# Patient Record
Sex: FEMALE | Race: AMERICAN INDIAN OR ALASKA NATIVE | NOT HISPANIC OR LATINO | Employment: OTHER | URBAN - METROPOLITAN AREA
[De-identification: names, ages, dates, MRNs, and addresses within clinical notes are randomized per-mention and may not be internally consistent; named-entity substitution may affect disease eponyms.]

---

## 2017-01-06 ENCOUNTER — CARE COORDINATION (OUTPATIENT)
Dept: GASTROENTEROLOGY | Facility: CLINIC | Age: 67
End: 2017-01-06

## 2017-01-06 NOTE — PROGRESS NOTES
Attempted to reach patient for check in, no answer, message left on patient's and friend Alyssa's phone requesting call back, number provided.

## 2017-01-13 NOTE — PROGRESS NOTES
Patient states she is doing well.  She received speech therapy to help with word finding, and states the issues she is having are due to previous TBI, not a new condition.  She continues to live at [facility] in [place].  She is provided one meal per day at lunch, and neighbor Alyssa continues to assist with medication set-up.  Reviewed upcoming appointment with Dr. Coyne in February, and patient requests a check in call in a couple weeks.  Patient has no further questions or concerns, and verbalized understanding of the conversation.

## 2017-01-18 DIAGNOSIS — K74.60 CIRRHOSIS OF LIVER WITHOUT ASCITES, UNSPECIFIED HEPATIC CIRRHOSIS TYPE (H): Primary | ICD-10-CM

## 2017-01-30 ENCOUNTER — CARE COORDINATION (OUTPATIENT)
Dept: GASTROENTEROLOGY | Facility: CLINIC | Age: 67
End: 2017-01-30

## 2017-01-30 DIAGNOSIS — B19.20 DECOMPENSATED HCV CIRRHOSIS (H): Primary | ICD-10-CM

## 2017-01-30 DIAGNOSIS — K74.69 DECOMPENSATED HCV CIRRHOSIS (H): Primary | ICD-10-CM

## 2017-01-30 RX ORDER — LACTULOSE 20 G/30ML
30 SOLUTION ORAL 3 TIMES DAILY
Qty: 2000 ML | Refills: 3 | Status: SHIPPED | OUTPATIENT
Start: 2017-01-30

## 2017-01-30 NOTE — PROGRESS NOTES
"Patient contacted writer herself.  She states she is doing \"really well\".  She states she feels very good about being able to take care of herself.  Alyssa helps occasionally, but not as often as she used to have to.  She feels she is eating better, gained a few pounds she thinks, although she does admit to some lower extremity edema especially on the right.  She requests a refill of lactulose, and this was routed to [facility] pharmacy in [place] per her request. She has been seeing her primary care physician regularly, most recently on 1/19.  She calls today to get a reminder of her next appointment with Dr. Coyne, which is scheduled on 2/21, check in to lab at 10:30.  Patient would like a reminder call as the date gets closer, about a week ahead of time.  She has no further questions or requests at this time, and verbalized understanding of the conversation.  "

## 2017-02-13 ENCOUNTER — CARE COORDINATION (OUTPATIENT)
Dept: GASTROENTEROLOGY | Facility: CLINIC | Age: 67
End: 2017-02-13

## 2017-02-13 NOTE — PROGRESS NOTES
Attempted to reach patient for check in, no answer, message left with reminder of upcoming appointment with Dr. Coyne on 2/21, requested call back, number provided.

## 2017-02-19 ENCOUNTER — HOME CARE/HOSPICE - HEALTHEAST (OUTPATIENT)
Dept: HOME HEALTH SERVICES | Facility: HOME HEALTH | Age: 67
End: 2017-02-19

## 2017-03-06 ENCOUNTER — CARE COORDINATION (OUTPATIENT)
Dept: GASTROENTEROLOGY | Facility: CLINIC | Age: 67
End: 2017-03-06

## 2017-03-06 NOTE — LETTER
"    March 6, 2017       TO: Estefania CORLEY Ericka  [address]  [place] [address]         Dear Ms. Barnett,    We missed you at your last appointment at [facility] HEPATOLOG[facility] with Dr. Coyne. We have several options to help you reschedule your appointment:      Call [phone]    Visit our website at [email or web address] and click \"I Want To\" (In Upper Right) and select Request an Appointment    Request an appointment via Nomi at [email or web address]    We are committed to providing you with exceptional care and service. It's important that our patients can get appointments when they need them, so we ask that you make every effort to consistently attend scheduled appointments and give us notice when you need to cancel an appointment.    If financial concerns have kept you from your appointment, we want to help. Please call a financial counselor at [phone] to assist you.      Sincerely,      [facility]PAT[facility]  "

## 2017-03-06 NOTE — PROGRESS NOTES
Attempted to reach patient for check in regarding missed appointment.  No answer and unable to leave message with either patient or emergency contact.  Will send out missed appointment letter.

## 2017-03-20 ENCOUNTER — HOME CARE/HOSPICE - HEALTHEAST (OUTPATIENT)
Dept: HOME HEALTH SERVICES | Facility: HOME HEALTH | Age: 67
End: 2017-03-20

## 2017-03-23 ENCOUNTER — OFFICE VISIT - HEALTHEAST (OUTPATIENT)
Dept: GERIATRICS | Facility: CLINIC | Age: 67
End: 2017-03-23

## 2017-03-23 DIAGNOSIS — F33.1 MAJOR DEPRESSIVE DISORDER, RECURRENT EPISODE, MODERATE (H): ICD-10-CM

## 2017-03-23 DIAGNOSIS — G40.909 SEIZURE DISORDER (H): ICD-10-CM

## 2017-03-23 DIAGNOSIS — S00.83XA HEMATOMA OF OCCIPITAL SURFACE OF HEAD: ICD-10-CM

## 2017-03-23 DIAGNOSIS — Z87.820 HISTORY OF TRAUMATIC BRAIN INJURY: ICD-10-CM

## 2017-03-23 DIAGNOSIS — R00.1 SINUS BRADYCARDIA: ICD-10-CM

## 2017-03-23 DIAGNOSIS — K04.6 PERIAPICAL ABSCESS OF TOOTH WITH FISTULA: ICD-10-CM

## 2017-03-23 DIAGNOSIS — F17.200 TOBACCO DEPENDENCY: ICD-10-CM

## 2017-03-23 DIAGNOSIS — G25.0 BENIGN ESSENTIAL TREMOR: ICD-10-CM

## 2017-03-23 DIAGNOSIS — G89.29 CHRONIC PAIN: ICD-10-CM

## 2017-03-23 DIAGNOSIS — T42.0X1S ACCIDENTAL PHENYTOIN POISONING, SEQUELA: ICD-10-CM

## 2017-03-23 DIAGNOSIS — I10 ESSENTIAL HYPERTENSION: ICD-10-CM

## 2017-03-23 ASSESSMENT — MIFFLIN-ST. JEOR: SCORE: 1010.33

## 2017-03-24 NOTE — PROGRESS NOTES
Final attempt to reach patient or emergency contact unsuccessful, letter previously sent out.  Will resume coordination if patient returns to clinic.

## 2017-03-27 NOTE — PROGRESS NOTES
Patient called to update on her status.  She states she has been in and out of [facility] in the past several weeks.  She has been experiencing seizures 2-3 times per week, falls, weakness, confusion, and was finally placed at [facility] about 3 days ago.  Patient's speech is sometimes clear and fluent, and sometimes with long periods of word searching and difficulty getting her thoughts verbalized.  She states she feels good to be at the facility, because she was no longer feeling safe at home because of the falls and seizures.  She states that although the last time she was in this facility she persistently asked to be released to home, this time she hopes to stay there much longer until she feels stronger and with seizures under control.  She is wondering about treatment for her hep C, and she is still interested in pursuing this.

## 2017-03-30 NOTE — PROGRESS NOTES
Patient called to re-schedule missed appointment with Dr. Coyne from 2/21.  Sent to scheduling and new appointment is 4/4 at 11:30 with labs at 10:30.  Patient and Africa Tenorio are both aware of this appointment.

## 2017-04-04 ENCOUNTER — OFFICE VISIT (OUTPATIENT)
Dept: GASTROENTEROLOGY | Facility: CLINIC | Age: 67
End: 2017-04-04
Attending: INTERNAL MEDICINE
Payer: MEDICARE

## 2017-04-04 VITALS
SYSTOLIC BLOOD PRESSURE: 155 MMHG | HEIGHT: 62 IN | OXYGEN SATURATION: 97 % | TEMPERATURE: 98.8 F | WEIGHT: 124.6 LBS | HEART RATE: 58 BPM | BODY MASS INDEX: 22.93 KG/M2 | DIASTOLIC BLOOD PRESSURE: 87 MMHG

## 2017-04-04 DIAGNOSIS — K74.69 COMPENSATED HCV CIRRHOSIS (H): Primary | ICD-10-CM

## 2017-04-04 DIAGNOSIS — B19.20 COMPENSATED HCV CIRRHOSIS (H): Primary | ICD-10-CM

## 2017-04-04 DIAGNOSIS — K74.60 CIRRHOSIS OF LIVER WITHOUT ASCITES, UNSPECIFIED HEPATIC CIRRHOSIS TYPE (H): ICD-10-CM

## 2017-04-04 DIAGNOSIS — E46 MALNUTRITION (H): ICD-10-CM

## 2017-04-04 LAB
ALBUMIN SERPL-MCNC: 2.7 G/DL (ref 3.4–5)
ALP SERPL-CCNC: 121 U/L (ref 40–150)
ALT SERPL W P-5'-P-CCNC: 27 U/L (ref 0–50)
ANION GAP SERPL CALCULATED.3IONS-SCNC: 8 MMOL/L (ref 3–14)
AST SERPL W P-5'-P-CCNC: 31 U/L (ref 0–45)
BASOPHILS # BLD AUTO: 0 10E9/L (ref 0–0.2)
BASOPHILS NFR BLD AUTO: 0.8 %
BILIRUB DIRECT SERPL-MCNC: 0.2 MG/DL (ref 0–0.2)
BILIRUB SERPL-MCNC: 0.5 MG/DL (ref 0.2–1.3)
BUN SERPL-MCNC: 15 MG/DL (ref 7–30)
CALCIUM SERPL-MCNC: 8.5 MG/DL (ref 8.5–10.1)
CHLORIDE SERPL-SCNC: 106 MMOL/L (ref 94–109)
CO2 SERPL-SCNC: 25 MMOL/L (ref 20–32)
CREAT SERPL-MCNC: 0.45 MG/DL (ref 0.52–1.04)
DIFFERENTIAL METHOD BLD: ABNORMAL
EOSINOPHIL # BLD AUTO: 0.1 10E9/L (ref 0–0.7)
EOSINOPHIL NFR BLD AUTO: 1.8 %
ERYTHROCYTE [DISTWIDTH] IN BLOOD BY AUTOMATED COUNT: 13 % (ref 10–15)
GFR SERPL CREATININE-BSD FRML MDRD: ABNORMAL ML/MIN/1.7M2
GLUCOSE SERPL-MCNC: 85 MG/DL (ref 70–99)
HCT VFR BLD AUTO: 33.4 % (ref 35–47)
HGB BLD-MCNC: 10.8 G/DL (ref 11.7–15.7)
IMM GRANULOCYTES # BLD: 0 10E9/L (ref 0–0.4)
IMM GRANULOCYTES NFR BLD: 0.4 %
INR PPP: 1.08 (ref 0.86–1.14)
LYMPHOCYTES # BLD AUTO: 1.9 10E9/L (ref 0.8–5.3)
LYMPHOCYTES NFR BLD AUTO: 36.9 %
MCH RBC QN AUTO: 31.8 PG (ref 26.5–33)
MCHC RBC AUTO-ENTMCNC: 32.3 G/DL (ref 31.5–36.5)
MCV RBC AUTO: 98 FL (ref 78–100)
MONOCYTES # BLD AUTO: 0.6 10E9/L (ref 0–1.3)
MONOCYTES NFR BLD AUTO: 12 %
NEUTROPHILS # BLD AUTO: 2.4 10E9/L (ref 1.6–8.3)
NEUTROPHILS NFR BLD AUTO: 48.1 %
NRBC # BLD AUTO: 0 10*3/UL
NRBC BLD AUTO-RTO: 0 /100
PLATELET # BLD AUTO: 178 10E9/L (ref 150–450)
POTASSIUM SERPL-SCNC: 4.2 MMOL/L (ref 3.4–5.3)
PROT SERPL-MCNC: 7.4 G/DL (ref 6.8–8.8)
RBC # BLD AUTO: 3.4 10E12/L (ref 3.8–5.2)
SODIUM SERPL-SCNC: 138 MMOL/L (ref 133–144)
WBC # BLD AUTO: 5.1 10E9/L (ref 4–11)

## 2017-04-04 PROCEDURE — 85025 COMPLETE CBC W/AUTO DIFF WBC: CPT | Performed by: INTERNAL MEDICINE

## 2017-04-04 PROCEDURE — 80048 BASIC METABOLIC PNL TOTAL CA: CPT | Performed by: INTERNAL MEDICINE

## 2017-04-04 PROCEDURE — 80076 HEPATIC FUNCTION PANEL: CPT | Performed by: INTERNAL MEDICINE

## 2017-04-04 PROCEDURE — 36415 COLL VENOUS BLD VENIPUNCTURE: CPT | Performed by: INTERNAL MEDICINE

## 2017-04-04 PROCEDURE — 85610 PROTHROMBIN TIME: CPT | Performed by: INTERNAL MEDICINE

## 2017-04-04 PROCEDURE — 99213 OFFICE O/P EST LOW 20 MIN: CPT | Mod: ZF

## 2017-04-04 RX ORDER — SUMATRIPTAN 100 MG/1
100 TABLET, FILM COATED ORAL
COMMUNITY
Start: 2014-11-04

## 2017-04-04 ASSESSMENT — PAIN SCALES - GENERAL: PAINLEVEL: MODERATE PAIN (5)

## 2017-04-04 NOTE — LETTER
4/4/2017       RE: Estefania Barnett  [address]  [place] [address]     Dear Colleague,    Thank you for referring your patient, Estefania Barnett, to the [facility] HEPATOLOGY at [facility]. Please see a copy of my visit note below.    Hepatology Clinic note  Estefania Barnett   Date of Birth 1950  Date of Service 4/4/2017         Impression/plan:   Estefania Barnett is a 66 year old female with history of prior alcohol abuse, TBI complicated by seizure disorder, HCV, genotype 1a, ,treatment naive, complicated by cirrhosis and encephalopathy, as well as malnutrition who presents for f/u of cirrhosis and HCV infection.     From liver standpoint. She has normal liver enzymes and synthetic function (INR, bilirubin), with active HCV infection when last checked in 11/2016.   Her MELD-Na is 7 and she has mild decompensation in the form of malnutrition.   The recent problems requiring hospitalization do not appear c/w HE, and she doesn't appear to have clinically significant HE at this time.     - I suggest full neuro evaluation due to new movement problems: parkinson's-like of the right arms  - Encouraged her to connect with her PCP regarding the recent admission.     - From cirrhosis standpoint, she needs US for HCC screening, EGD and colonoscopy, however, we should wait until neuro status is cleared and antiepilepsy medications are stable, as well as having stable social situation (will require at least 3 months without interruption of HCV therapy or adjustment of other medications).   Therefore, no changes from liver standpoint at this time.     RTC in 3 months or sooner as needed    Yuly Coyne MD  [facility] Hepatology clinic    -----------------------------------------------------       HPI:   Estefania Barnett is a 66 year old female with history of prior alcohol abuse, TBI complicated by seizure disorder, HCV, genotype 1a, ,treatment naive, complicated  by cirrhosis and encephalopathy, as well as malnutrition who presents for f/u of cirrhosis and HCV infection.     Please refer to prior clinic note for details of initial presentation.     Has multiple complications since we last met, including few hospitalizations at least 10 times in the last few months [facility] for seizure like activities, falls due to weakness and tripping on items. Has been at [facility] over this period of time, and seems to like it due to the care she receives. Notes to be diagnosed with a stroke, having residual problems with walking and using a walker. She had occipital scalp hematoma without associated fractures, after a fall. Is currently seizure free x 3 weeks.  She still requires significant amount of support and assistance with daily activities. However, feels appetite is good and her weight is stable. Having BMs 3-4 times/day with lactulose.   She notes new right arm tremors that have worsened over the past months, without a clear diagnosis.   Otherwise, no change to her situation.          Past Medical History:     Past Medical History:   Diagnosis Date     Depressive disorder             Past Surgical History:     Past Surgical History:   Procedure Laterality Date     GI SURGERY              Medications:     Current Outpatient Prescriptions   Medication     lactulose 20 GM/30ML SOLN     primidone (MYSOLINE) 50 MG tablet     sertraline (ZOLOFT) 100 MG tablet     aspirin 325 MG tablet     fish oil-omega-3 fatty acids 1000 MG capsule     Calcium Carb-Cholecalciferol (CALCIUM 1000 + D PO)     MECLIZINE HCL PO     propranolol (INDERAL) 20 MG tablet     divalproex (DEPAKOTE ER) 500 MG 24 hr tablet     PHENYTOIN PO     TRAZODONE HCL PO     Gabapentin (NEURONTIN PO)     oxybutynin (DITROPAN-XL) 5 MG 24 hr tablet     AMITRIPTYLINE HCL PO     OMEPRAZOLE PO     No current facility-administered medications for this visit.             Allergies:     Allergies   Allergen Reactions      "Vancomycin Hives     Vancomycin      Turned red with Vancomycin            Social History:     Social History     Social History     Marital status:      Spouse name: N/A     Number of children: N/A     Years of education: N/A     Occupational History     Not on file.     Social History Main Topics     Smoking status: Current Every Day Smoker     Packs/day: 0.30     Types: Cigarettes     Smokeless tobacco: Not on file     Alcohol use No     Drug use: No     Sexual activity: Not on file     Other Topics Concern     Not on file     Social History Narrative            Family History:   Negative for chronic liver disease            Review of Systems:   10 points ROS was obtained and highlighted in the HPI, otherwise negative.          Physical Exam:   VS:  /87  Pulse 58  Temp 98.8  F (37.1  C) (Oral)  Ht 1.575 m (5' 2\")  Wt 56.5 kg (124 lb 9.6 oz)  SpO2 97%  BMI 22.79 kg/m2    Gen: A&Ox3, NAD  HEENT: non-icteric, oropharynx clear  CV: RRR  Lung: CTAB  Abd: soft, NT, ND  Ext: no edema, intact pulses.   Skin: stigmata of chronic liver disease.   Neuro:  no asterixis, although with noticeable tremors right sided > left.   Psych: appropriate mood and affects         Data:   Reviewed in person and significant for:  Lab Results   Component Value Date    WBC 5.1 04/04/2017     Lab Results   Component Value Date    RBC 3.40 04/04/2017     Lab Results   Component Value Date    HGB 10.8 04/04/2017     Lab Results   Component Value Date    HCT 33.4 04/04/2017     No components found for: MCT  Lab Results   Component Value Date    MCV 98 04/04/2017     Lab Results   Component Value Date    MCH 31.8 04/04/2017     Lab Results   Component Value Date    MCHC 32.3 04/04/2017     Lab Results   Component Value Date    RDW 13.0 04/04/2017     Lab Results   Component Value Date     04/04/2017     Last Basic Metabolic Panel:  Lab Results   Component Value Date     04/04/2017      Lab Results   Component " Value Date    POTASSIUM 4.2 04/04/2017     Lab Results   Component Value Date    CHLORIDE 106 04/04/2017     Lab Results   Component Value Date    DAVID 8.5 04/04/2017     Lab Results   Component Value Date    CO2 25 04/04/2017     Lab Results   Component Value Date    BUN 15 04/04/2017     Lab Results   Component Value Date    CR 0.45 04/04/2017     Lab Results   Component Value Date    GLC 85 04/04/2017     Liver Function Studies -   Recent Labs   Lab Test  04/04/17   1047   PROTTOTAL  7.4   ALBUMIN  2.7*   BILITOTAL  0.5   ALKPHOS  121   AST  31   ALT  27       Again, thank you for allowing me to participate in the care of your patient.      Sincerely,    Yuly Coyne MD

## 2017-04-04 NOTE — MR AVS SNAPSHOT
"              After Visit Summary   4/4/2017    Estefania Barnett    MRN: [id number]           Patient Information     Date Of Birth          1950        Visit Information        Provider Department      4/4/2017 11:30 AM Yuly Coyne MD [facility] Hepatology         Follow-ups after your visit        Your next 10 appointments already scheduled     Jul 11, 2017 10:00 AM CDT   Lab with  LAB   [facility] Lab ([facility])    [address]  1st Floor  [place] [id number] [phone]            Jul 11, 2017 11:00 AM CDT   (Arrive by 10:45 AM)   Return General Liver with Yuly Coyne MD   [facility] Hepat[facility] ([facility])    [address]  3rd Floor  [place] [address] [phone]              Who to contact     If you have questions or need follow up information about today's clinic visit or your schedule please contact [facility] HEPATOLOG[facility] directly at [phone].  Normal or non-critical lab and imaging results will be communicated to you by [employer], letter or phone within 4 business days after the clinic has received the results. If you do not hear from us within 7 days, please contact the clinic through [employer] or phone. If you have a critical or abnormal lab result, we will notify you by phone as soon as possible.  Submit refill requests through [email or web address] or call your pharmacy and they will forward the refill request to us. Please allow 3 business days for your refill to be completed.          Additional Information About Your Visit        [email or web address] Information     [email or web address] lets you send messages to your doctor, view your test results, renew your prescriptions, schedule appointments and more. To sign up, go to [email or web address] . Click on \"Log in\" on the left side of the screen, which will take you to the Welcome page. Then click on \"Sign up Now\" on the right side of the page.     You will be asked to enter the access code listed " "below, as well as some personal information. Please follow the directions to create your username and password.     Your access code is: [email or web address]  Expires: 2017  6:30 AM     Your access code will  in 90 days. If you need help or a new code, please call your [place] clinic or [phone].        Care EveryWhere ID     This is your Care EveryWhere ID. This could be used by other organizations to access your [place] medical records  [id number]        Your Vitals Were     Pulse Temperature Height Pulse Oximetry BMI (Body Mass Index)       58 98.8  F (37.1  C) (Oral) 1.575 m (5' 2\") 97% 22.79 kg/m2        Blood Pressure from Last 3 Encounters:   17 155/87   16 123/77   16 125/71    Weight from Last 3 Encounters:   17 56.5 kg (124 lb 9.6 oz)   16 51.2 kg (112 lb 14.4 oz)   16 51.3 kg (113 lb)              Today, you had the following     No orders found for display         Today's Medication Changes          These changes are accurate as of: 17 12:17 PM.  If you have any questions, ask your nurse or doctor.               These medicines have changed or have updated prescriptions.        Dose/Directions    SUMAtriptan 100 MG tablet   Commonly known as:  IMITREX   This may have changed:  Another medication with the same name was removed. Continue taking this medication, and follow the directions you see here.   Changed by:  Yuly Coyne MD        Dose:  100 mg   Take 100 mg by mouth   Refills:  0         Stop taking these medicines if you haven't already. Please contact your care team if you have questions.     AMITRIPTYLINE HCL PO   Stopped by:  Yuly Coyne MD           oxybutynin 5 MG 24 hr tablet   Commonly known as:  DITROPAN-XL   Stopped by:  Yuly Coyne MD           PHENYTOIN PO   Stopped by:  Yuly Coyne MD           primidone 50 MG tablet   Commonly known as:  MYSOLINE   Stopped by:  Yuly Coyne MD           TRAZODONE HCL PO   Stopped by:  " Yuly Coyne MD                    Primary Care Provider Office Phone # Fax #    Vivien Bessy Phelps [phone]        [facility] [address] [address]        Thank you!     Thank you for choosing [facility] HEPATOLOG[facility]  for your care. Our goal is always to provide you with excellent care. Hearing back from our patients is one way we can continue to improve our services. Please take a few minutes to complete the written survey that you may receive in the mail after your visit with us. Thank you!             Your Updated Medication List - Protect others around you: Learn how to safely use, store and throw away your medicines at www.disposemymeds.org.          This list is accurate as of: 4/4/17 12:17 PM.  Always use your most recent med list.                   Brand Name Dispense Instructions for use    aspirin 325 MG tablet      Take 81 mg by mouth daily       CALCIUM 1000 + D PO      Take 1 tablet by mouth daily       divalproex 500 MG 24 hr tablet    DEPAKOTE ER     Take 1,000 mg by mouth daily       fish oil-omega-3 fatty acids 1000 MG capsule      Take 1 g by mouth daily       KEPPRA PO      Take 500 mg by mouth daily       lactulose 20 GM/30ML Soln     2000 mL    Take 30 mLs by mouth 3 times daily       LISINOPRIL PO      Take 20 mg by mouth       MECLIZINE HCL PO      Take 25 mg by mouth 3 times daily as needed for dizziness       METOCLOPRAMIDE HCL PO      Take 10 mg by mouth every 6 hours as needed       NEURONTIN PO      Take 400 mg by mouth 3 times daily Increase by 2 tabs as directed.       OMEPRAZOLE PO      Take 20 mg by mouth as needed       ONDANSETRON PO      Take 4 mg by mouth as needed for nausea       propranolol 20 MG tablet    INDERAL     Take 40 mg by mouth 2 times daily Take 1 tab in a.m. and 2 tabs in p.m.       sertraline 100 MG tablet    ZOLOFT     Take 150 mg by mouth daily       SUMAtriptan 100 MG tablet    IMITREX     Take 100 mg by mouth

## 2017-04-04 NOTE — NURSING NOTE
"Chief Complaint   Patient presents with     RECHECK     Liver follow up       Initial /87  Pulse 58  Temp 98.8  F (37.1  C) (Oral)  Ht 1.575 m (5' 2\")  Wt 56.5 kg (124 lb 9.6 oz)  SpO2 97%  BMI 22.79 kg/m2 Estimated body mass index is 22.79 kg/(m^2) as calculated from the following:    Height as of this encounter: 1.575 m (5' 2\").    Weight as of this encounter: 56.5 kg (124 lb 9.6 oz).  Medication Reconciliation: complete    "

## 2017-04-04 NOTE — PROGRESS NOTES
Hepatology Clinic note  Estefania Barnett   Date of Birth 1950  Date of Service 4/4/2017         Impression/plan:   Estefania Barnett is a 66 year old female with history of prior alcohol abuse, TBI complicated by seizure disorder, HCV, genotype 1a, ,treatment naive, complicated by cirrhosis and encephalopathy, as well as malnutrition who presents for f/u of cirrhosis and HCV infection.     From liver standpoint. She has normal liver enzymes and synthetic function (INR, bilirubin), with active HCV infection when last checked in 11/2016.   Her MELD-Na is 7 and she has mild decompensation in the form of malnutrition.   The recent problems requiring hospitalization do not appear c/w HE, and she doesn't appear to have clinically significant HE at this time.     - I suggest full neuro evaluation due to new movement problems: parkinson's-like of the right arms  - Encouraged her to connect with her PCP regarding the recent admission.     - From cirrhosis standpoint, she needs US for HCC screening, EGD and colonoscopy, however, we should wait until neuro status is cleared and antiepilepsy medications are stable, as well as having stable social situation (will require at least 3 months without interruption of HCV therapy or adjustment of other medications).   Therefore, no changes from liver standpoint at this time.     RTC in 3 months or sooner as needed    Yuly Coyne MD  [facility] Hepatology clinic    -----------------------------------------------------       HPI:   Estefania Barnett is a 66 year old female with history of prior alcohol abuse, TBI complicated by seizure disorder, HCV, genotype 1a, ,treatment naive, complicated by cirrhosis and encephalopathy, as well as malnutrition who presents for f/u of cirrhosis and HCV infection.     Please refer to prior clinic note for details of initial presentation.     Has multiple complications since we last met, including few hospitalizations at least 10  times in the last few months [facility] for seizure like activities, falls due to weakness and tripping on items. Has been at [facility] over this period of time, and seems to like it due to the care she receives. Notes to be diagnosed with a stroke, having residual problems with walking and using a walker. She had occipital scalp hematoma without associated fractures, after a fall. Is currently seizure free x 3 weeks.  She still requires significant amount of support and assistance with daily activities. However, feels appetite is good and her weight is stable. Having BMs 3-4 times/day with lactulose.   She notes new right arm tremors that have worsened over the past months, without a clear diagnosis.   Otherwise, no change to her situation.          Past Medical History:     Past Medical History:   Diagnosis Date     Depressive disorder             Past Surgical History:     Past Surgical History:   Procedure Laterality Date     GI SURGERY              Medications:     Current Outpatient Prescriptions   Medication     lactulose 20 GM/30ML SOLN     primidone (MYSOLINE) 50 MG tablet     sertraline (ZOLOFT) 100 MG tablet     aspirin 325 MG tablet     fish oil-omega-3 fatty acids 1000 MG capsule     Calcium Carb-Cholecalciferol (CALCIUM 1000 + D PO)     MECLIZINE HCL PO     propranolol (INDERAL) 20 MG tablet     divalproex (DEPAKOTE ER) 500 MG 24 hr tablet     PHENYTOIN PO     TRAZODONE HCL PO     Gabapentin (NEURONTIN PO)     oxybutynin (DITROPAN-XL) 5 MG 24 hr tablet     AMITRIPTYLINE HCL PO     OMEPRAZOLE PO     No current facility-administered medications for this visit.             Allergies:     Allergies   Allergen Reactions     Vancomycin Hives     Vancomycin      Turned red with Vancomycin            Social History:     Social History     Social History     Marital status:      Spouse name: N/A     Number of children: N/A     Years of education: N/A     Occupational History     Not on file.  "    Social History Main Topics     Smoking status: Current Every Day Smoker     Packs/day: 0.30     Types: Cigarettes     Smokeless tobacco: Not on file     Alcohol use No     Drug use: No     Sexual activity: Not on file     Other Topics Concern     Not on file     Social History Narrative            Family History:   Negative for chronic liver disease            Review of Systems:   10 points ROS was obtained and highlighted in the HPI, otherwise negative.          Physical Exam:   VS:  /87  Pulse 58  Temp 98.8  F (37.1  C) (Oral)  Ht 1.575 m (5' 2\")  Wt 56.5 kg (124 lb 9.6 oz)  SpO2 97%  BMI 22.79 kg/m2    Gen: A&Ox3, NAD  HEENT: non-icteric, oropharynx clear  CV: RRR  Lung: CTAB  Abd: soft, NT, ND  Ext: no edema, intact pulses.   Skin: stigmata of chronic liver disease.   Neuro:  no asterixis, although with noticeable tremors right sided > left.   Psych: appropriate mood and affects         Data:   Reviewed in person and significant for:  Lab Results   Component Value Date    WBC 5.1 04/04/2017     Lab Results   Component Value Date    RBC 3.40 04/04/2017     Lab Results   Component Value Date    HGB 10.8 04/04/2017     Lab Results   Component Value Date    HCT 33.4 04/04/2017     No components found for: MCT  Lab Results   Component Value Date    MCV 98 04/04/2017     Lab Results   Component Value Date    MCH 31.8 04/04/2017     Lab Results   Component Value Date    MCHC 32.3 04/04/2017     Lab Results   Component Value Date    RDW 13.0 04/04/2017     Lab Results   Component Value Date     04/04/2017     Last Basic Metabolic Panel:  Lab Results   Component Value Date     04/04/2017      Lab Results   Component Value Date    POTASSIUM 4.2 04/04/2017     Lab Results   Component Value Date    CHLORIDE 106 04/04/2017     Lab Results   Component Value Date    DAVID 8.5 04/04/2017     Lab Results   Component Value Date    CO2 25 04/04/2017     Lab Results   Component Value Date    BUN 15 " 04/04/2017     Lab Results   Component Value Date    CR 0.45 04/04/2017     Lab Results   Component Value Date    GLC 85 04/04/2017     Liver Function Studies -   Recent Labs   Lab Test  04/04/17   1047   PROTTOTAL  7.4   ALBUMIN  2.7*   BILITOTAL  0.5   ALKPHOS  121   AST  31   ALT  27

## 2017-04-06 ENCOUNTER — AMBULATORY - HEALTHEAST (OUTPATIENT)
Dept: GERIATRICS | Facility: CLINIC | Age: 67
End: 2017-04-06

## 2017-04-11 ENCOUNTER — CARE COORDINATION (OUTPATIENT)
Dept: GASTROENTEROLOGY | Facility: CLINIC | Age: 67
End: 2017-04-11

## 2017-04-11 NOTE — PROGRESS NOTES
Called to update care team regarding Dr. Coyne's recommendations.  Patient is no longer a patient in their facility, and could not tell me whether the patient was discharged home, to the hospital, or to another care facility.  Message left with admissions director at [facility].  Unable to reach patient via mobile number, unable to reach emergency contact, message left.

## 2017-04-13 NOTE — PROGRESS NOTES
Attempted to reach patient for check in, no answer, message left requesting call back, number provided.  4/24/17  Unable to reach patient or emergency contact.  Message left with friend requesting call back.

## 2017-04-25 ENCOUNTER — OFFICE VISIT - HEALTHEAST (OUTPATIENT)
Dept: GERIATRICS | Facility: CLINIC | Age: 67
End: 2017-04-25

## 2017-04-25 DIAGNOSIS — I63.9 CEREBRAL INFARCTION, UNSPECIFIED MECHANISM (H): ICD-10-CM

## 2017-04-25 DIAGNOSIS — I10 ESSENTIAL HYPERTENSION WITH GOAL BLOOD PRESSURE LESS THAN 140/90: ICD-10-CM

## 2017-04-25 DIAGNOSIS — G40.209 PARTIAL SYMPTOMATIC EPILEPSY WITH COMPLEX PARTIAL SEIZURES, NOT INTRACTABLE, WITHOUT STATUS EPILEPTICUS (H): ICD-10-CM

## 2017-04-25 DIAGNOSIS — Z65.8 INADEQUATE SOCIAL SUPPORT: ICD-10-CM

## 2017-04-25 DIAGNOSIS — N32.81 OVERACTIVE BLADDER: ICD-10-CM

## 2017-04-25 DIAGNOSIS — L89.92 PRESSURE ULCER, STAGE II (H): ICD-10-CM

## 2017-04-25 DIAGNOSIS — K21.9 GERD WITHOUT ESOPHAGITIS: ICD-10-CM

## 2017-04-25 DIAGNOSIS — B18.2 CHRONIC HEPATITIS C WITHOUT HEPATIC COMA (H): ICD-10-CM

## 2017-04-25 DIAGNOSIS — F11.10 OPIOID ABUSE (H): ICD-10-CM

## 2017-04-25 DIAGNOSIS — Z72.0 TOBACCO ABUSE: ICD-10-CM

## 2017-04-25 DIAGNOSIS — R29.6 FREQUENT FALLS: ICD-10-CM

## 2017-04-25 DIAGNOSIS — Z87.820 HISTORY OF TRAUMATIC BRAIN INJURY: ICD-10-CM

## 2017-04-25 DIAGNOSIS — F31.9 BIPOLAR AFFECTIVE DISORDER, REMISSION STATUS UNSPECIFIED (H): ICD-10-CM

## 2017-04-25 DIAGNOSIS — R41.0 DISORIENTATION: ICD-10-CM

## 2017-04-25 DIAGNOSIS — F10.10 ALCOHOL ABUSE: ICD-10-CM

## 2017-04-25 DIAGNOSIS — G93.40 ACUTE ENCEPHALOPATHY: ICD-10-CM

## 2017-04-25 DIAGNOSIS — F12.10 MARIJUANA ABUSE: ICD-10-CM

## 2017-04-25 DIAGNOSIS — F33.1 MAJOR DEPRESSIVE DISORDER, RECURRENT EPISODE, MODERATE (H): ICD-10-CM

## 2017-04-27 ENCOUNTER — OFFICE VISIT - HEALTHEAST (OUTPATIENT)
Dept: GERIATRICS | Facility: CLINIC | Age: 67
End: 2017-04-27

## 2017-04-27 DIAGNOSIS — R29.6 FREQUENT FALLS: ICD-10-CM

## 2017-04-27 DIAGNOSIS — F31.9 BIPOLAR AFFECTIVE DISORDER, REMISSION STATUS UNSPECIFIED (H): ICD-10-CM

## 2017-04-27 DIAGNOSIS — R41.0 DISORIENTATION: ICD-10-CM

## 2017-04-27 DIAGNOSIS — K21.9 GERD WITHOUT ESOPHAGITIS: ICD-10-CM

## 2017-04-27 DIAGNOSIS — Z65.8 INADEQUATE SOCIAL SUPPORT: ICD-10-CM

## 2017-05-03 ENCOUNTER — AMBULATORY - HEALTHEAST (OUTPATIENT)
Dept: GERIATRICS | Facility: CLINIC | Age: 67
End: 2017-05-03

## 2017-06-12 ENCOUNTER — MEDICAL CORRESPONDENCE (OUTPATIENT)
Dept: HEALTH INFORMATION MANAGEMENT | Facility: CLINIC | Age: 67
End: 2017-06-12

## 2017-07-07 DIAGNOSIS — K70.30 ALCOHOLIC CIRRHOSIS OF LIVER WITHOUT ASCITES (H): Primary | ICD-10-CM

## 2017-07-07 NOTE — PROGRESS NOTES
7/7/2017  9:50 AM      RN Care Coordination Hepatology   Labs entered per protocol. Patient has an appointment with Dr. Coyne on 7/11 at 11:00am and will have labs drawn prior to provider appointment.       Renée Mckinnon RN, BSN, PHN  [facility]   RN Care Coordinator for Hepatology Specialty Clinic/Program

## 2017-09-04 ENCOUNTER — HOME CARE/HOSPICE - HEALTHEAST (OUTPATIENT)
Dept: HOME HEALTH SERVICES | Facility: HOME HEALTH | Age: 67
End: 2017-09-04

## 2017-09-05 ENCOUNTER — HOME CARE/HOSPICE - HEALTHEAST (OUTPATIENT)
Dept: HOME HEALTH SERVICES | Facility: HOME HEALTH | Age: 67
End: 2017-09-05

## 2017-09-07 ENCOUNTER — HOME CARE/HOSPICE - HEALTHEAST (OUTPATIENT)
Dept: HOME HEALTH SERVICES | Facility: HOME HEALTH | Age: 67
End: 2017-09-07

## 2017-09-08 ENCOUNTER — HOME CARE/HOSPICE - HEALTHEAST (OUTPATIENT)
Dept: HOME HEALTH SERVICES | Facility: HOME HEALTH | Age: 67
End: 2017-09-08

## 2017-09-09 ENCOUNTER — HOME CARE/HOSPICE - HEALTHEAST (OUTPATIENT)
Dept: HOME HEALTH SERVICES | Facility: HOME HEALTH | Age: 67
End: 2017-09-09

## 2017-09-10 ENCOUNTER — HOME CARE/HOSPICE - HEALTHEAST (OUTPATIENT)
Dept: HOME HEALTH SERVICES | Facility: HOME HEALTH | Age: 67
End: 2017-09-10

## 2021-05-26 ENCOUNTER — RECORDS - HEALTHEAST (OUTPATIENT)
Dept: ADMINISTRATIVE | Facility: CLINIC | Age: 71
End: 2021-05-26

## 2021-05-30 VITALS — BODY MASS INDEX: 21.38 KG/M2 | HEIGHT: 62 IN | WEIGHT: 116.2 LBS

## 2021-05-30 VITALS — BODY MASS INDEX: 20.03 KG/M2 | HEIGHT: 63 IN

## 2021-05-30 VITALS — WEIGHT: 114.2 LBS | BODY MASS INDEX: 20.23 KG/M2

## 2021-06-09 NOTE — PROGRESS NOTES
Code Status:  FULL CODE  Visit Type: H & P     Facility:  [facility] [[id number]]      Facility Type: SNF (Skilled Nursing Facility, TCU)    History of Present Illness:   Hospital Admission Date: March 18, 2017 [facility] Discharge Date: March 22, 2017  Facility Admission Date: March 22, 2017 [facility]    Estefania Barnett is a 66 y.o. female With history of a traumatic brain injury that is because development of seizures. She also has underlying history of hypertension essential tremors hepatitis C. She was brought to the emergency department by the ambulance from an assisted living when a neighbor apparently had heard her fall. Apparently she had been having repetitive falls over the past week. She admitted to poor appetite but taking her medicines. She had no other acute complaints. She complained of hitting the back of her head but complained of any particular neck pain.    Hospital course; CT of head I was done was found to be unremarkable except for a right occipital hematoma, and a. Apical abscess of tooth number 14. Additionally on laboratory she was found to have hypoglycemia and on presentation was bradycardic. Her Dilantin level was elevated and was felt to be likely due to an accidental overdose. For this reason it was stopped. She currently is taking Keppra and Depakote. They had been doing free Dilantin levels and it went from a high of 4..2-2.3 . she had no seizures in the hospital in neurology wanted her followed until her Dilantin level was less than two. They felt the 24 hour observation was important particularly in lieu of for frequent falls which were thought to be possibly breakthrough seizures versus ataxia due to Dilantin overdose. For the abscess of the tooth they placed her on Augmentin and recommended follow up with the dentist. For the right suboccipital hematoma they recommended supportive management. Her sinus bradycardia had normalized despite being on  propranolol him. They recommended watching. Laboratory did not reveal any significant electrolyte abnormalities nor CBC showing any abnormalities.    Past Medical History:   Diagnosis Date     Anxiety      Arthritis     right leg     Breakthrough seizure 3/30/2015     Depression      Frequent falls 9/18/2015     Hepatitis C      HTN (hypertension)      Overactive bladder      Seizure disorder      Seizure disorder 3/30/2015     Smoking      TBI (traumatic brain injury)      Past Surgical History:   Procedure Laterality Date     BRAIN SURGERY       CHOLECYSTECTOMY       FRACTURE SURGERY Left     ankle     HYSTERECTOMY      Hysterectomy prior to menopause     Family History   Problem Relation Age of Onset     Alcohol abuse Mother      Alcohol abuse Brother      Social History     Social History     Marital status:      Spouse name: N/A     Number of children: N/A     Years of education: N/A     Occupational History     Not on file.     Social History Main Topics     Smoking status: Current Every Day Smoker     Packs/day: 0.35     Years: 25.00     Smokeless tobacco: Never Used     Alcohol use No     Drug use: Yes     Special: Marijuana     Sexual activity: No     Other Topics Concern     Not on file     Social History Narrative     Additional Geriatric ReviewPatient lives alone in assisted living has smoked since she was 14 years old. She has five children all live in the area and she is  she is no contact with any of them. She previously was somewhat independent but in an assisted living two to her above diagnoses of traumatic brain injury.  Current Outpatient Prescriptions   Medication Sig Dispense Refill     amoxicillin-clavulanate (AUGMENTIN) 875-125 mg per tablet Take 1 tablet by mouth 2 (two) times a day with meals for 4 days. 8 tablet 0     aspirin 81 MG EC tablet Take 81 mg by mouth daily.       calcium carbonate-vitamin D3 (CALCIUM 600 + D,3,) 600 mg(1,500mg) -400 unit per tablet Take 1 tablet  by mouth 2 (two) times a day.       chlorhexidine (PERIDEX) 0.12 % solution Swish and spit 120 mL 0     divalproex (DEPAKOTE) 250 MG 24 hr tablet Take 3 tablets (750 mg total) by mouth 2 (two) times a day. 90 tablet 0     docoshexanoic acid-eicosapent 500 mg (FISH OIL) 500-100 mg cap capsule Take 1,000 mg by mouth daily.       gabapentin (NEURONTIN) 800 MG tablet Take 0.5 tablets (400 mg total) by mouth 3 (three) times a day.  0     ibuprofen (ADVIL,MOTRIN) 600 MG tablet Take 600 mg by mouth daily as needed for pain.       lactulose 10 gram/15 mL solution Take 20 g by mouth 3 (three) times a day.   3     levETIRAcetam (KEPPRA) 500 MG tablet Take 1 tablet (500 mg total) by mouth 2 (two) times a day. 60 tablet 0     lisinopril (PRINIVIL,ZESTRIL) 20 MG tablet Take 20 mg by mouth daily.       meclizine (ANTIVERT) 25 mg tablet Take 25 mg by mouth 3 (three) times a day as needed for dizziness.        metoclopramide (REGLAN) 10 MG tablet Take 10 mg by mouth 4 (four) times a day before meals and at bedtime.       omeprazole (PRILOSEC) 20 MG capsule Take 20 mg by mouth daily.       ondansetron (ZOFRAN) 4 MG tablet Take 4 mg by mouth every 8 (eight) hours as needed for nausea.       propranolol (INDERAL) 40 MG tablet Take 40 mg by mouth 2 (two) times a day as needed (tremor).        sertraline (ZOLOFT) 100 MG tablet Take 150 mg by mouth daily. One and one-half tablet daily       SUMAtriptan (IMITREX) 6 mg/0.5 mL Soln Inject 6 mg under the skin every hour as needed (for headache). After the first dose, a second dose may be used if the headache recurs. MAXIMUM of 2 doses per 24 hours.       No current facility-administered medications for this visit.      Allergies   Allergen Reactions     Vancomycin Other (See Comments)     Redness and burning     Immunization History   Administered Date(s) Administered     Influenza, inj, historic 02/03/2017     Influenza,seasonal quad, PF, 36+MOS 09/20/2015         Review of Systems    Constitutional: Negative.  Negative for appetite change, chills, diaphoresis, fatigue, fever and unexpected weight change.   HENT: Negative for congestion, dental problem, ear pain, hearing loss, nosebleeds, sinus pressure, sore throat, tinnitus and trouble swallowing.    Eyes: Negative for photophobia, pain, discharge, itching and visual disturbance.   Respiratory: Negative for apnea, cough, chest tightness, shortness of breath and wheezing.    Cardiovascular: Negative for chest pain and palpitations.   Gastrointestinal: Negative for abdominal distention, abdominal pain, constipation and diarrhea.   Endocrine: Negative for cold intolerance, heat intolerance and polydipsia.   Genitourinary: Negative.  Negative for decreased urine volume, difficulty urinating, dysuria, enuresis, frequency, hematuria, menstrual problem, urgency and vaginal discharge.   Musculoskeletal: Negative for arthralgias, back pain and gait problem.   Allergic/Immunologic: Negative.    Neurological: Positive for headaches. Negative for dizziness, tremors, syncope, facial asymmetry, speech difficulty, weakness, light-headedness and numbness.        Pain in the back of her head inferior   Hematological: Negative.    Psychiatric/Behavioral: Negative for agitation, behavioral problems, confusion, decreased concentration, dysphoric mood and hallucinations. The patient is not hyperactive.         Physical Exam   Constitutional: She is oriented to person, place, and time. She appears well-developed and well-nourished.   HENT:   Head: Normocephalic and atraumatic.   Right Ear: External ear normal.   Left Ear: External ear normal.   Nose: Nose normal.   Mouth/Throat: Oropharynx is clear and moist.   Eyes: Conjunctivae and EOM are normal. Pupils are equal, round, and reactive to light. Left eye exhibits no discharge. No scleral icterus.   Neck: Neck supple. No JVD present. No tracheal deviation present. No thyromegaly present.   Cardiovascular: Normal  rate, regular rhythm and normal heart sounds.  Exam reveals no friction rub.    No murmur heard.  Pulmonary/Chest: Effort normal and breath sounds normal. No respiratory distress. She has no wheezes. She has no rales. She exhibits no tenderness.   Abdominal: She exhibits no distension and no mass. There is no tenderness. There is no guarding.   Musculoskeletal: Normal range of motion. She exhibits no edema or tenderness.   Lymphadenopathy:     She has no cervical adenopathy.   Neurological: She is alert and oriented to person, place, and time. She has normal reflexes. No cranial nerve deficit. She exhibits normal muscle tone. Coordination normal.   Along the occiput there is a swelling and tenderness. There is no cranial crepitus. Range of motion at the neck normal   Skin: Skin is warm and dry. No rash noted. No erythema.   Psychiatric: She has a normal mood and affect. Her behavior is normal. Thought content normal.         Labs:  All labs reviewed in the nursing home record.  Recent Results (from the past 240 hour(s))   Basic Metabolic Panel   Result Value Ref Range    Sodium 136 136 - 145 mmol/L    Potassium 4.1 3.5 - 5.0 mmol/L    Chloride 107 98 - 107 mmol/L    CO2 24 22 - 31 mmol/L    Anion Gap, Calculation 5 5 - 18 mmol/L    Glucose 65 (L) 70 - 125 mg/dL    Calcium 8.5 8.5 - 10.5 mg/dL    BUN 23 (H) 8 - 22 mg/dL    Creatinine 0.62 0.60 - 1.10 mg/dL    GFR MDRD Af Amer >60 >60 mL/min/1.73m2    GFR MDRD Non Af Amer >60 >60 mL/min/1.73m2   Magnesium   Result Value Ref Range    Magnesium 1.9 1.8 - 2.6 mg/dL   Troponin I   Result Value Ref Range    Troponin I <0.01 0.00 - 0.29 ng/mL   Hepatic Profile   Result Value Ref Range    Bilirubin, Total 0.6 0.0 - 1.0 mg/dL    Bilirubin, Direct 0.3 <=0.5 mg/dL    Protein, Total 7.4 6.0 - 8.0 g/dL    Albumin 2.9 (L) 3.5 - 5.0 g/dL    Alkaline Phosphatase 95 45 - 120 U/L    AST 25 0 - 40 U/L    ALT 18 0 - 45 U/L   HM1 (CBC with Diff)   Result Value Ref Range    WBC 4.6 4.0 -  11.0 thou/uL    RBC 3.67 (L) 3.80 - 5.40 mill/uL    Hemoglobin 12.1 12.0 - 16.0 g/dL    Hematocrit 37.6 35.0 - 47.0 %     (H) 80 - 100 fL    MCH 33.0 27.0 - 34.0 pg    MCHC 32.2 32.0 - 36.0 g/dL    RDW 13.9 11.0 - 14.5 %    Platelets 158 140 - 440 thou/uL    MPV 11.6 8.5 - 12.5 fL    Neutrophils % 40 (L) 50 - 70 %    Lymphocytes % 48 (H) 20 - 40 %    Monocytes % 9 2 - 10 %    Eosinophils % 2 0 - 6 %    Basophils % 1 0 - 2 %    Neutrophils Absolute 1.8 (L) 2.0 - 7.7 thou/uL    Lymphocytes Absolute 2.2 0.8 - 4.4 thou/uL    Monocytes Absolute 0.4 0.0 - 0.9 thou/uL    Eosinophils Absolute 0.1 0.0 - 0.4 thou/uL    Basophils Absolute 0.1 0.0 - 0.2 thou/uL   Urinalysis-UC if Indicated   Result Value Ref Range    Color, UA Straw Colorless, Yellow, Straw, Light Yellow    Clarity, UA Clear Clear    Glucose, UA Negative Negative    Bilirubin, UA Negative Negative    Ketones, UA Negative Negative    Specific Gravity, UA 1.007 1.001 - 1.030    Blood, UA Negative Negative    pH, UA 6.5 4.5 - 8.0    Protein, UA Negative Negative mg/dL    Urobilinogen, UA <2.0 E.U./dL <2.0 E.U./dL, 2.0 E.U./dL    Nitrite, UA Negative Negative    Leukocytes, UA Negative Negative   ECG 12 lead nursing unit performed   Result Value Ref Range    SYSTOLIC BLOOD PRESSURE  mmHg    DIASTOLIC BLOOD PRESSURE  mmHg    VENTRICULAR RATE 51 BPM    ATRIAL RATE 51 BPM    P-R INTERVAL 156 ms    QRS DURATION 88 ms    Q-T INTERVAL 454 ms    QTC CALCULATION (BEZET) 418 ms    P Axis 40 degrees    R AXIS 50 degrees    T AXIS 50 degrees    MUSE DIAGNOSIS       Sinus bradycardia  Otherwise normal ECG  When compared with ECG of 17-FEB-2017 14:00,  No significant change was found  Confirmed by DUY CORTEZ MD LOC: ([id number]) on 3/19/2017 9:45:30 AM     Magnesium   Result Value Ref Range    Magnesium 1.9 1.8 - 2.6 mg/dL   Phenytoin (Dilantin )   Result Value Ref Range    Phenytoin 22.2 (H) 10.0 - 20.0 ug/mL   Valproic Acid (Depakene )   Result Value Ref Range     Valproic Acid 60.3 50.0 - 150.0 ug/mL   POCT Glucose   Result Value Ref Range    Glucose, POC 93 mg/dL   Phenytoin, Free (Dilantin , Free)   Result Value Ref Range    Phenytoin, Free 4.0 (HH) 1.0 - 2.0 ug/mL   POCT Glucose   Result Value Ref Range    Glucose,  mg/dL   POCT Glucose   Result Value Ref Range    Glucose, POC 87 mg/dL   POCT Glucose   Result Value Ref Range    Glucose, POC 97 mg/dL   POCT Glucose   Result Value Ref Range    Glucose, POC 74 mg/dL   Phenytoin, Free (Dilantin , Free)   Result Value Ref Range    Phenytoin, Free 3.4 (HH) 1.0 - 2.0 ug/mL   Valproic Acid, Free (Depakene , Free)   Result Value Ref Range    Scan Result See Scanned Report     Valproic Acid, Unbound 11.9 6.0 - 20.0 ug/mL   Gabapentin(Neurontin )   Result Value Ref Range    Gabapentin 4.7 2.0 - 20.0 mcg/mL   POCT Glucose   Result Value Ref Range    Glucose, POC 81 mg/dL   Phenytoin, Free (Dilantin , Free)   Result Value Ref Range    Phenytoin, Free 2.9 (HH) 1.0 - 2.0 ug/mL   POCT Glucose   Result Value Ref Range    Glucose, POC 83 mg/dL   POCT Glucose   Result Value Ref Range    Glucose, POC 98 mg/dL   POCT Glucose   Result Value Ref Range    Glucose, POC 96 mg/dL   Phenytoin, Free (Dilantin , Free)   Result Value Ref Range    Phenytoin, Free 2.3 (H) 1.0 - 2.0 ug/mL   POCT Glucose   Result Value Ref Range    Glucose, POC 83 mg/dL         Assessment:  1. Accidental phenytoin poisoning, sequela     2. History of traumatic brain injury     3. Major depressive disorder, recurrent episode, moderate     4. Hematoma of occipital surface of head     5. Periapical abscess of tooth with fistula     6. Tobacco dependency     7. Sinus bradycardia     8. Benign essential tremor     9. Chronic pain     10. Seizure disorder     11. Essential hypertension         Plan: We will get her involved in OT and PT insist her balance although I did observe her walking with good gait speed with the front wheel walker for her numerous cigarette breaks  outside. In addition we will repeat the free Dilantin level in hopes that we can get below to. We will do CBC with this.    Total 45 minutes of which 55% was spent in counseling and coordination of care of the above plan.    Electronically signed by: Orlando Jack MD
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
## 2021-08-03 PROBLEM — R56.9 SEIZURES (H): Status: RESOLVED | Noted: 2017-01-04 | Resolved: 2017-03-19
